# Patient Record
Sex: MALE | Race: WHITE | URBAN - METROPOLITAN AREA
[De-identification: names, ages, dates, MRNs, and addresses within clinical notes are randomized per-mention and may not be internally consistent; named-entity substitution may affect disease eponyms.]

---

## 2017-01-20 ENCOUNTER — OFFICE VISIT (OUTPATIENT)
Dept: FAMILY MEDICINE | Facility: CLINIC | Age: 52
End: 2017-01-20

## 2017-01-20 ENCOUNTER — TRANSFERRED RECORDS (OUTPATIENT)
Dept: FAMILY MEDICINE | Facility: CLINIC | Age: 52
End: 2017-01-20

## 2017-01-20 VITALS
HEART RATE: 68 BPM | WEIGHT: 216 LBS | SYSTOLIC BLOOD PRESSURE: 118 MMHG | DIASTOLIC BLOOD PRESSURE: 70 MMHG | TEMPERATURE: 97.8 F | HEIGHT: 73 IN | BODY MASS INDEX: 28.63 KG/M2 | OXYGEN SATURATION: 98 %

## 2017-01-20 DIAGNOSIS — I82.442 ACUTE DEEP VEIN THROMBOSIS (DVT) OF TIBIAL VEIN OF LEFT LOWER EXTREMITY (H): Primary | ICD-10-CM

## 2017-01-20 DIAGNOSIS — Z23 NEED FOR VACCINATION: ICD-10-CM

## 2017-01-20 DIAGNOSIS — D68.51 HETEROZYGOUS FACTOR V LEIDEN MUTATION (H): ICD-10-CM

## 2017-01-20 LAB — INR POINT OF CARE: 1.9 (ref 0.9–1.1)

## 2017-01-20 PROCEDURE — 36415 COLL VENOUS BLD VENIPUNCTURE: CPT | Performed by: PHYSICIAN ASSISTANT

## 2017-01-20 PROCEDURE — 99213 OFFICE O/P EST LOW 20 MIN: CPT | Mod: 25 | Performed by: PHYSICIAN ASSISTANT

## 2017-01-20 PROCEDURE — 90686 IIV4 VACC NO PRSV 0.5 ML IM: CPT | Performed by: PHYSICIAN ASSISTANT

## 2017-01-20 PROCEDURE — 90471 IMMUNIZATION ADMIN: CPT | Performed by: PHYSICIAN ASSISTANT

## 2017-01-20 PROCEDURE — 85610 PROTHROMBIN TIME: CPT | Performed by: PHYSICIAN ASSISTANT

## 2017-01-20 RX ORDER — WARFARIN SODIUM 5 MG/1
TABLET ORAL
Qty: 90 TABLET | Refills: 0 | Status: SHIPPED | OUTPATIENT
Start: 2017-01-20 | End: 2017-01-30

## 2017-01-20 NOTE — PROGRESS NOTES
SUBJECTIVE:                                                    Moose Enciso is a 51 year old male who presents to clinic today for the following health issues:        Chronic coumadin use    Indication: DVT    Recent oncology visit - warfarin recommended for 6 months - labs pending. FU with oncology in 5 months.   Has Factor V Mutation    Recent Labs   Lab Test  01/20/17   1011  12/29/16   1610  12/20/16   1029  12/16/16   1221  12/14/16   1113  12/12/16   0951   INR  1.9  2.0  2.0  2.1  1.9  1.7        Current Coumadin Dose:  7.5 all days, 10 MWF    Bleeding Signs/Symptoms:  None  Thromboembolic Signs/Symptoms:  None    Medication Changes:  No  Dietary Changes:  No  Bacterial/Viral Infection:  No    Missed Coumadin Doses:  None    Other Concerns:  Flu shot today            ROS:   C: NEGATIVE for fever, chills, change in weight  E: NEGATIVE for vision changes or irritation  E/M: NEGATIVE for ear, mouth and throat problems  R: NEGATIVE for significant cough or SOB  CV: NEGATIVE for chest pain, palpitations or peripheral edema  GI: NEGATIVE for nausea, abdominal pain, heartburn, or change in bowel habits  : NEGATIVE for frequency, dysuria, or hematuria    Problem list, Medication list, Allergies, and Medical/Social/Surgical histories reviewed in Psychiatric and updated as appropriate.      Labs reviewed in EPIC  BP Readings from Last 3 Encounters:   01/20/17 118/70   12/29/16 102/64   12/20/16 110/66    Wt Readings from Last 3 Encounters:   01/20/17 97.977 kg (216 lb)   12/29/16 95.255 kg (210 lb)   12/20/16 95.8 kg (211 lb 3.2 oz)                  Patient Active Problem List   Diagnosis     Health Care Home     ACP (advance care planning)     Malignant melanoma of skin of upper limb, including shoulder, left (H)     Calculus of kidney     Acute deep vein thrombosis (DVT) of tibial vein of left lower extremity (H)     Heterozygous factor V Leiden mutation (H)     Past Surgical History   Procedure Laterality Date     Pet, rec  "of melanoma/met ca       Appendectomy         Social History   Substance Use Topics     Smoking status: Former Smoker     Smokeless tobacco: Never Used      Comment: occasionally in his 20s     Alcohol Use: 3.0 oz/week     5 Standard drinks or equivalent per week     Family History   Problem Relation Age of Onset     Coronary Artery Disease No family hx of      CEREBROVASCULAR DISEASE No family hx of      DIABETES Maternal Grandmother      Hypertension No family hx of      Colon Cancer No family hx of      Prostate Cancer No family hx of      Blood Disease No family hx of          Current Outpatient Prescriptions   Medication Sig Dispense Refill     warfarin (COUMADIN) 5 MG tablet Take two tablets on Monday, Wed, Friday, Sunday (10 mg), other days take 1.5 tablets (7.5mg ) 90 tablet 0     [DISCONTINUED] warfarin (COUMADIN) 5 MG tablet Take two tablets on Monday, Wed,  and Friday (10 mg), other days take 1.5 tablets (7.5mg ) 60 tablet 0     Allergies   Allergen Reactions     Penicillins      Recent Labs   Lab Test  12/09/16   1108   ALT  11   CR  0.92   GFRESTIMATED  97   POTASSIUM  4.3            OBJECTIVE:                                                    /70 mmHg  Pulse 68  Temp(Src) 97.8  F (36.6  C) (Oral)  Ht 1.854 m (6' 1\")  Wt 97.977 kg (216 lb)  BMI 28.50 kg/m2  SpO2 98%   Body mass index is 28.5 kg/(m^2).       Patient is a 51 year old male, in no acute distress. Vitals noted   Head: Normocephalic, atraumatic.  Eyes: Conjunctiva clear.  No discharge noted.  Ears: External ears, canals and TMs normal Bilaterally: gray and translucent.   Nose: Normal without discharge.  Mouth / Throat:   Pharynx non-erythematous, no exudates present, tonsils without hypertrophy. Mucous membranes moist.  Neck:  Neck supple, No lymphadenopathy, no thyromegaly.  Cardiac:  Normal rhythm and rate, no murmurs, rubs or gallops.  Lungs: clear to auscultation bilaterally; no wheezes, crackles or rhonchi     "       ASSESSMENT/PLAN:                                                          ICD-10-CM    1. Acute deep vein thrombosis (DVT) of tibial vein of left lower extremity (H) I82.442 PROTHROMBIN TIME/INR     VENOUS COLLECTION     warfarin (COUMADIN) 5 MG tablet   2. Need for vaccination Z23 HC FLU VAC PRESRV FREE QUAD SPLIT VIR 3+YRS IM     VACCINE ADMINISTRATION, INITIAL   3. Heterozygous factor V Leiden mutation (H) D68.51          Therapeutic INR for goal of 2-3    New Dose: increase 10 mg 4 days a week    Next INR: 1 month for CPE      Carin Corona PA-C  1/19/2017

## 2017-01-20 NOTE — NURSING NOTE
Moose Enciso is here for INR    Pre-Visit Screening :  Immunizations : up to date  Colon Screening : is due and to be scheduled by patient for later completion  Asthma Action Test/Plan : NA  PHQ9/GAD7 :  NA    BP done on the left arm, with a large sized cuff.  Pulse - regular  My Chart - declines    CLASSIFICATION OF OVERWEIGHT AND OBESITY BY BMI                         Obesity Class           BMI(kg/m2)  Underweight                                    < 18.5  Normal                                         18.5-24.9  Overweight                                     25.0-29.9  OBESITY                     I                  30.0-34.9                              II                 35.0-39.9  EXTREME OBESITY             III                >40                             Patient's  BMI Body mass index is 28.5 kg/(m^2).  http://hin.nhlbi.nih.gov/menuplanner/menu.cgi  Questioned patient about current smoking habits.  Pt. has never smoked.    Sonya Oliver MA

## 2017-01-28 DIAGNOSIS — I82.442 ACUTE DEEP VEIN THROMBOSIS (DVT) OF TIBIAL VEIN OF LEFT LOWER EXTREMITY (H): Primary | ICD-10-CM

## 2017-01-28 RX ORDER — WARFARIN SODIUM 5 MG/1
TABLET ORAL
Qty: 60 TABLET | Refills: 0 | OUTPATIENT
Start: 2017-01-28

## 2017-01-30 NOTE — TELEPHONE ENCOUNTER
Carin,     Pt was prescribed 90 tablets to last 1 months, however Pt really needs 270 to tablets to last 1 month.  He has 90 tablets to  that will last him 10 days. Here is a prescription to fill for 180 pills that will last him for the entire month until he returns.      Do you want to refill this?  Pending Prescriptions:                       Disp   Refills    warfarin (COUMADIN) 5 MG tablet           180 ta*0            Sig: Take two tablets on Monday, Wed, Friday, Sunday           (10 mg), other days take 1.5 tablets (7.5mg )  Refused Prescriptions:                       Disp   Refills    warfarin (COUMADIN) 5 MG tablet [Pharmacy *60 tab*0        Sig: TAKE 2 TABLETS BY MOUTH ON MONDAY AND FRIDAY. ON           OTHER DAYS TAKE 1 1/2 TABLETS  Refused By: [address] [facility]  Reason for Refusal: Duplicate  Reason for Refusal Comment: #90 was sent to [facility] on 1/20/17          Pharmacy has been selected  Tmamy.[email or web address] Larsen ([facility])

## 2017-01-31 RX ORDER — WARFARIN SODIUM 5 MG/1
TABLET ORAL
Qty: 90 TABLET | Refills: 0 | Status: SHIPPED | OUTPATIENT
Start: 2017-01-31 | End: 2017-03-21

## 2017-01-31 NOTE — TELEPHONE ENCOUNTER
That math doesn't make sense. Max he is taking 2 tablets a day which is 60 pills for one month and I just sent in a prescription last week.     I called pt.   He states he never got rx from mail order pharmacy and has been out for a couple of days.     I sent in a new rx to [facility].      Please call the mail order pharmacy in his chart and cancel rx that was sent to them.      aCrin Corona PA-C  1/31/2017

## 2017-02-24 ENCOUNTER — OFFICE VISIT (OUTPATIENT)
Dept: FAMILY MEDICINE | Facility: CLINIC | Age: 52
End: 2017-02-24

## 2017-02-24 VITALS
WEIGHT: 211.1 LBS | TEMPERATURE: 97.7 F | DIASTOLIC BLOOD PRESSURE: 70 MMHG | HEIGHT: 76 IN | BODY MASS INDEX: 25.71 KG/M2 | OXYGEN SATURATION: 98 % | SYSTOLIC BLOOD PRESSURE: 108 MMHG | HEART RATE: 82 BPM

## 2017-02-24 DIAGNOSIS — D68.51 FACTOR V LEIDEN MUTATION (H): ICD-10-CM

## 2017-02-24 DIAGNOSIS — Z00.00 ROUTINE GENERAL MEDICAL EXAMINATION AT A HEALTH CARE FACILITY: Primary | ICD-10-CM

## 2017-02-24 DIAGNOSIS — I82.442 ACUTE DEEP VEIN THROMBOSIS (DVT) OF TIBIAL VEIN OF LEFT LOWER EXTREMITY (H): ICD-10-CM

## 2017-02-24 DIAGNOSIS — Z85.820 HISTORY OF MELANOMA: ICD-10-CM

## 2017-02-24 DIAGNOSIS — Z12.11 SPECIAL SCREENING FOR MALIGNANT NEOPLASMS, COLON: ICD-10-CM

## 2017-02-24 LAB
ERYTHROCYTE [DISTWIDTH] IN BLOOD BY AUTOMATED COUNT: 11.5 %
GLUCOSE SERPL-MCNC: 84 MG/DL (ref 60–99)
HCT VFR BLD AUTO: 50.4 % (ref 40–53)
HEMOGLOBIN: 16 G/DL (ref 13.3–17.7)
INR POINT OF CARE: 2.2 (ref 0.9–1.1)
MCH RBC QN AUTO: 29.3 PG (ref 26–33)
MCHC RBC AUTO-ENTMCNC: 31.7 G/DL (ref 31–36)
MCV RBC AUTO: 92.3 FL (ref 78–100)
PLATELET COUNT - QUEST: 245 10^9/L (ref 150–375)
RBC # BLD AUTO: 5.46 10*12/L (ref 4.4–5.9)
WBC # BLD AUTO: 6 10*9/L (ref 4–11)

## 2017-02-24 PROCEDURE — 82947 ASSAY GLUCOSE BLOOD QUANT: CPT | Performed by: PHYSICIAN ASSISTANT

## 2017-02-24 PROCEDURE — 85610 PROTHROMBIN TIME: CPT | Performed by: PHYSICIAN ASSISTANT

## 2017-02-24 PROCEDURE — 36415 COLL VENOUS BLD VENIPUNCTURE: CPT | Performed by: PHYSICIAN ASSISTANT

## 2017-02-24 PROCEDURE — 80061 LIPID PANEL: CPT | Mod: 90 | Performed by: PHYSICIAN ASSISTANT

## 2017-02-24 PROCEDURE — 99396 PREV VISIT EST AGE 40-64: CPT | Performed by: PHYSICIAN ASSISTANT

## 2017-02-24 PROCEDURE — 85027 COMPLETE CBC AUTOMATED: CPT | Performed by: PHYSICIAN ASSISTANT

## 2017-02-24 NOTE — LETTER
[facility]  [place] [facility] [address]  [place]  [address]    February 28, 2017        Moose Enciso  [address]  [place] [address]              Dear Moose Enciso    Fasting glucose was normal - no evidence of diabetes.    No anemia  Normal white blood cell count  Normal Platelets count    Your cholesterol levels were elevated  Goal for total cholesterol is less than 200, HDL (good cholesterol) is Greater than 45, LDL (bad cholesterol) is less than 130 and less than 100 if high risk, and triglycerides should be less than 150    Since your numbers are higher than normal, we would first recommend a trial of low cholesterol diet and regular (4-5 times per week) exercise.     Food options that are considered Heart healthy ( lower LDL and raise HDL) are: olives, peanuts, cashews, almonds, avocados and fish.    Flax seed is a good source of fiber and lowers most pople's cholesterol. You can sprinkle this on cereal daily.     We should re-check your cholesterol in 1 year.       Labs Resulted Today:   Results for orders placed or performed in visit on 02/24/17   PROTHROMBIN TIME/INR   Result Value Ref Range    INR Protime 2.2    Lipid Profile (QUEST)   Result Value Ref Range    Cholesterol 214 (H) 125 - 200 mg/dL    HDL Cholesterol 42 > OR = 40 mg/dL    Triglycerides 149 <150 mg/dL    LDL Cholesterol Calculated 142 (H) <130 mg/dL (calc)    Cholesterol/HDL Ratio 5.1 (H) < OR = 5.0 (calc)    Non HDL Cholesterol 172 (H) mg/dL (calc)   Glucose Fasting (BFP)   Result Value Ref Range    Glucose 84 60 - 99 mg/dL   HEMOGRAM/PLATELET   Result Value Ref Range    WBC 6.0 4.0 - 11 10*9/L    RBC Count 5.46 4.4 - 5.9 10*12/L    Hemoglobin 16.0 13.3 - 17.7 g/dL    Hematocrit 50.4 40.0 - 53.0 %    MCV 92.3 78 - 100 fL    MCH 29.3 26 - 33 pg    MCHC 31.7 31 - 36 g/dL    RDW 11.5 %    Platelet Count 245 150 - 375 10^9/L            If you have any further questions or  problems, please contact our office at [phone].          Carin Corona PA-C

## 2017-02-24 NOTE — NURSING NOTE
Moose is here for CPX    Patient is here for a full physical exam and pap.  Pre-Visit Screening :  Immunizations : up to date  Colonoscopy : is due and to be scheduled by patient for later completion  Asthma Action Plan/Test : SIDNEY  PHQ9/GAD7 : SIDNEY  Pulse - regular  My Chart - declines    CLASSIFICATION OF OVERWEIGHT AND OBESITY BY BMI                         Obesity Class           BMI(kg/m2)  Underweight                                    < 18.5  Normal                                         18.5-24.9  Overweight                                     25.0-29.9  OBESITY                     I                  30.0-34.9                              II                 35.0-39.9  EXTREME OBESITY             III                >40                             Patient's  BMI Body mass index is 25.7 kg/(m^2).  http://hin.nhlbi.nih.gov/menuplanner/menu.cgi  Questioned patient about current smoking habits.  Pt. has never smoked.    [unfilled]  ETOH screening:  Questions:  1-How often do you have a drink containing alcohol?                             5 times per week(s)  2-How many drinks containing alcohol do you have on a typical day when you are         Drinking?                              2   3- How often do you have 5 or more drinks on one occasion?                              NeverHave you ever:  @None of the patient's responses to the CAGE screening were positive / Negative CAGE score@

## 2017-02-24 NOTE — LETTER
[facility]  [place] [facility] [address]  [place]  [address]    February 24, 2017        Moose Enciso  [address]  [place] [address]              Dear Moose Frank would like you to have repeat ultrasound in May and follow up appointment with him to discuss your long term risks with the Factor V Leiden mutation.  Please schedule that ultrasound and appt. If you haven't.    I did put in the order for the colonoscopy and Dr. Frank will be consulted by the Gastroenterologist to determine whether we stop your coumadin and use an alternative medication for the colonoscopy. You should get a call from [facility] to schedule this.  If you would prefer to wait until after May, please let [facility] know this.      If you have any further questions or problems, please contact our office at [phone].          Carin Corona PA-C

## 2017-02-24 NOTE — PROGRESS NOTES
SUBJECTIVE:     CC: Moose Enciso is an 51 year old male who presents for preventative health visit.     Healthy Habits:    Do you get at least three servings of calcium containing foods daily (dairy, green leafy vegetables, etc.)? yes    Amount of exercise or daily activities, outside of work: none    Problems taking medications regularly No    Medication side effects: No    Have you had an eye exam in the past two years? yes    Do you see a dentist twice per year? Yes    Do you have sleep apnea, excessive snoring or daytime drowsiness?no      INR Check:  Indication: DVT  Current dose: 7.5 all days, 10 MWFSun    Bleeding Signs/Symptoms:  None  Thromboembolic Signs/Symptoms:  None    Medication Changes:  No  Dietary Changes:  No  Bacterial/Viral Infection:  No    Missed Coumadin Doses:  None          Today's PHQ-2 Score: No flowsheet data found.      Social History   Substance Use Topics     Smoking status: Former Smoker     Smokeless tobacco: Never Used      Comment: occasionally in his 20s     Alcohol use 3.0 oz/week     5 Standard drinks or equivalent per week     The patient does not drink >3 drinks per day nor >7 drinks per week.    Last PSA: No results found for: PSA    No results for input(s): CHOL, HDL, LDL, TRIG, CHOLHDLRATIO, NHDL in the last 98575 hours.    Reviewed orders with patient. Reviewed health maintenance and updated orders accordingly - Yes    All Histories reviewed and updated in Epic.  Past Medical History   Diagnosis Date     DVT (deep venous thrombosis) (H)      Kidney stone      Melanoma (H)       Past Surgical History   Procedure Laterality Date     Pet, rec of melanoma/met ca  1990     Appendectomy  1978       ROS:  C: NEGATIVE for fever, chills, change in weight  I: NEGATIVE for worrisome rashes, moles or lesions  E: NEGATIVE for vision changes or irritation  ENT: NEGATIVE for ear, mouth and throat problems  R: NEGATIVE for significant cough or SOB  CV: NEGATIVE for chest pain, palpitations  or peripheral edema  GI: NEGATIVE for nausea, abdominal pain, heartburn, or change in bowel habits   male: negative for dysuria, hematuria, decreased urinary stream, erectile dysfunction, urethral discharge  M: NEGATIVE for significant arthralgias or myalgia  N: NEGATIVE for weakness, dizziness or paresthesias  P: NEGATIVE for changes in mood or affect    Problem list, Medication list, Allergies, and Medical/Social/Surgical histories reviewed in [facility] and updated as appropriate.  Labs reviewed in EPIC  BP Readings from Last 3 Encounters:   02/24/17 108/70   01/20/17 118/70   12/29/16 102/64    Wt Readings from Last 3 Encounters:   02/24/17 95.8 kg (211 lb 1.6 oz)   01/20/17 98 kg (216 lb)   12/29/16 95.3 kg (210 lb)                  Patient Active Problem List   Diagnosis     Health Care Home     ACP (advance care planning)     Acute deep vein thrombosis (DVT) of tibial vein of left lower extremity (H)     Factor V Leiden mutation (H)     History of melanoma     Past Surgical History   Procedure Laterality Date     Pet, rec of melanoma/met ca  1990     Appendectomy  1978       Social History   Substance Use Topics     Smoking status: Former Smoker     Smokeless tobacco: Never Used      Comment: occasionally in his 20s     Alcohol use 3.0 oz/week     5 Standard drinks or equivalent per week     Family History   Problem Relation Age of Onset     DIABETES Maternal Grandmother      Coronary Artery Disease No family hx of      CEREBROVASCULAR DISEASE No family hx of      Hypertension No family hx of      Colon Cancer No family hx of      Prostate Cancer No family hx of      Blood Disease No family hx of          Current Outpatient Prescriptions   Medication Sig Dispense Refill     warfarin (COUMADIN) 5 MG tablet Take two tablets on Monday, Wed, Friday, Sunday (10 mg), other days take 1.5 tablets (7.5mg ) 90 tablet 0     Allergies   Allergen Reactions     Penicillins      Recent Labs   Lab Test  12/09/16   1108   ALT  11  "  CR  0.92   GFRESTIMATED  97   POTASSIUM  4.3      OBJECTIVE:     /70 (BP Location: Right arm, Patient Position: Chair, Cuff Size: Adult Large)  Pulse 82  Temp 97.7  F (36.5  C) (Oral)  Ht 1.93 m (6' 4\")  Wt 95.8 kg (211 lb 1.6 oz)  SpO2 98%  BMI 25.7 kg/m2  EXAM:  GENERAL: healthy, alert and no distress  EYES: Eyes grossly normal to inspection, PERRL and conjunctivae and sclerae normal  HENT: ear canals and TM's normal, nose and mouth without ulcers or lesions  NECK: no adenopathy, no asymmetry, masses, or scars and thyroid normal to palpation  RESP: lungs clear to auscultation - no rales, rhonchi or wheezes  CV: regular rate and rhythm, normal S1 S2, no S3 or S4, no murmur, click or rub, no peripheral edema and peripheral pulses strong  ABDOMEN: soft, nontender, no hepatosplenomegaly, no masses and bowel sounds normal   (male): normal male genitalia without lesions or urethral discharge, no hernia  RECTAL: normal sphincter tone, no rectal masses, prostate normal size, smooth, nontender without nodules or masses  MS: no gross musculoskeletal defects noted, no edema  SKIN: no suspicious lesions or rashes  NEURO: Normal strength and tone, mentation intact and speech normal  PSYCH: mentation appears normal, affect normal/bright    ASSESSMENT/PLAN:     1. Acute deep vein thrombosis (DVT) of tibial vein of left lower extremity (H)  - PROTHROMBIN TIME/INR  FU 6 weeks. OK for refills when calls    2. Routine general medical examination at a health care facility  - VENOUS COLLECTION  - Lipid Profile (QUEST)  - Glucose Fasting (BFP)  - HEMOGRAM/PLATELET    3. Factor V Leiden mutation (H)  I called [facility]  May recommended repeat US  Followed by consult with Dr. Frank    4. History of melanoma  Pt not seeing dermatology. Has no concerns    5. Screening for colon cancer  Referral placed      COUNSELING:   Special attention given to:        Regular exercise       Healthy diet/nutrition       Vision screening       " "Colon cancer screening    Blood Pressure:   BP Readings from Last 6 Encounters:   02/24/17 108/70   01/20/17 118/70   12/29/16 102/64   12/20/16 110/66   12/16/16 120/80   12/14/16 100/60     Don't leave message on cell  ok to leave message on home phone only          reports that he has quit smoking. He has never used smokeless tobacco.    Estimated body mass index is 25.7 kg/(m^2) as calculated from the following:    Height as of this encounter: 1.93 m (6' 4\").    Weight as of this encounter: 95.8 kg (211 lb 1.6 oz).   Weight management plan: Discussed healthy diet and exercise guidelines and patient will follow up in 12 months in clinic to re-evaluate.    Counseling Resources:  ATP IV Guidelines  Pooled Cohorts Equation Calculator  FRAX Risk Assessment  ICSI Preventive Guidelines  Dietary Guidelines for Americans, 2010  USDA's MyPlate  ASA Prophylaxis  Lung CA Screening    ARIANA Briceno  [facility]    "

## 2017-02-24 NOTE — MR AVS SNAPSHOT
After Visit Summary   2/24/2017    Moose Enciso    MRN: [id number]           Patient Information     Date Of Birth          1965        Visit Information        Provider Department      2/24/2017 9:30 AM Carin Corona PA [facility]        Today's Diagnoses     Routine general medical examination at a health care facility    -  1    Acute deep vein thrombosis (DVT) of tibial vein of left lower extremity (H)        Factor V Leiden mutation (H)        History of melanoma        Special screening for malignant neoplasms, colon           Follow-ups after your visit        Additional Services     GASTROENTEROLOGY ADULT REF PROCEDURE ONLY       [facility] [place] Please    Last Lab Result: Creatinine (mg/dL)       Date                     Value                 12/09/2016               0.92             ----------  Body mass index is 25.7 kg/(m^2).     Needed:  No  Language:  English    Patient will be contacted to schedule procedure.     Please be aware that coverage of these services is subject to the terms and limitations of your health insurance plan.  Call member services at your health plan with any benefit or coverage questions.  Any procedures must be performed at a [place] facility OR coordinated by your clinic's referral office.    Please bring the following with you to your appointment:    (1) Any X-Rays, CTs or MRIs which have been performed.  Contact the facility where they were done to arrange for  prior to your scheduled appointment.    (2) List of current medications   (3) This referral request   (4) Any documents/labs given to you for this referral                  Your next 10 appointments already scheduled     Apr 07, 2017  9:30 AM CDT   Office Visit with ARIANA Briceno   [facility] ([facility])    [address]  [address]  [facility] [id number] [phone]          "     Who to contact     If you have questions or need follow up information about today's clinic visit or your schedule please contact [facility] directly at [phone].  Normal or non-critical lab and imaging results will be communicated to you by MyChart, letter or phone within 4 business days after the clinic has received the results. If you do not hear from us within 7 days, please contact the clinic through MyChart or phone. If you have a critical or abnormal lab result, we will notify you by phone as soon as possible.  Submit refill requests through dotSyntax or call your pharmacy and they will forward the refill request to us. Please allow 3 business days for your refill to be completed.          Additional Information About Your Visit        Care EveryWhere ID     This is your Care EveryWhere ID. This could be used by other organizations to access your [place] medical records  [id number]        Your Vitals Were     Pulse Temperature Height Pulse Oximetry BMI (Body Mass Index)       82 97.7  F (36.5  C) (Oral) 1.93 m (6' 4\") 98% 25.7 kg/m2        Blood Pressure from Last 3 Encounters:   02/24/17 108/70   01/20/17 118/70   12/29/16 102/64    Weight from Last 3 Encounters:   02/24/17 95.8 kg (211 lb 1.6 oz)   01/20/17 98 kg (216 lb)   12/29/16 95.3 kg (210 lb)              We Performed the Following     GASTROENTEROLOGY ADULT REF PROCEDURE ONLY     Glucose Fasting (BFP)     HEMOGRAM/PLATELET     Lipid Profile (QUEST)     PROTHROMBIN TIME/INR     VENOUS COLLECTION        Primary Care Provider Office Phone # Fax #    Narciso Gonzalez [phone]       [facility] [address] [facility] [address]  [place] [facility] [place] [facility] [address]        Thank you!     Thank you for choosing [facility]  for your care. Our goal is always to provide you with excellent care. Hearing back from our patients is one way we can continue to improve our services. " Please take a few minutes to complete the written survey that you may receive in the mail after your visit with us. Thank you!             Your Updated Medication List - Protect others around you: Learn how to safely use, store and throw away your medicines at www.disposemymeds.org.          This list is accurate as of: 2/24/17 10:03 AM.  Always use your most recent med list.                   Brand Name Dispense Instructions for use    warfarin 5 MG tablet    COUMADIN    90 tablet    Take two tablets on Monday, Wed, Friday, Sunday (10 mg), other days take 1.5 tablets (7.5mg )

## 2017-02-25 LAB
CHOLEST SERPL-MCNC: 214 MG/DL (ref 125–200)
CHOLEST/HDLC SERPL: 5.1 (CALC)
HDLC SERPL-MCNC: 42 MG/DL
LDLC SERPL CALC-MCNC: 142 MG/DL (CALC)
NONHDLC SERPL-MCNC: 172 MG/DL (CALC)
TRIGL SERPL-MCNC: 149 MG/DL

## 2017-03-21 DIAGNOSIS — I82.442 ACUTE DEEP VEIN THROMBOSIS (DVT) OF TIBIAL VEIN OF LEFT LOWER EXTREMITY (H): ICD-10-CM

## 2017-03-21 RX ORDER — WARFARIN SODIUM 5 MG/1
TABLET ORAL
Qty: 90 TABLET | Refills: 0 | Status: SHIPPED | OUTPATIENT
Start: 2017-03-21 | End: 2017-05-07

## 2017-03-21 RX ORDER — WARFARIN SODIUM 5 MG/1
TABLET ORAL
Qty: 90 TABLET | Refills: 0 | OUTPATIENT
Start: 2017-03-21

## 2017-03-21 NOTE — TELEPHONE ENCOUNTER
Moose Enciso is requesting a refill of:    Pending Prescriptions:                       Disp   Refills    warfarin (COUMADIN) 5 MG tablet           90 tab*0            Sig: Take two tablets on Monday, Wed, Friday, Sunday           (10 mg), other days take 1.5 tablets (7.5mg )    Please close encounter if RX was sent. Thanks, Arti

## 2017-03-21 NOTE — TELEPHONE ENCOUNTER
Another Refill for request for Warfarin has come in.   Was refilled today by Renata Munoz.DANYELLE Larsen ([facility])

## 2017-04-06 NOTE — PROGRESS NOTES
SUBJECTIVE:                                                    Moose Enciso is a 51 year old male who presents to clinic today for the following health issues:        Chronic coumadin use    Indication: DVT, Factor V Leiden        Recent Labs   Lab Test  04/07/17   0936  02/24/17   0941  01/20/17   1011  12/29/16   1610  12/20/16   1029  12/16/16   1221   INR  2.7  2.2  1.9  2.0  2.0  2.1        Current Coumadin Dose:  7.5 all days, 10 MWFSun    Bleeding Signs/Symptoms:  None  Thromboembolic Signs/Symptoms:  None    Medication Changes:  No  Dietary Changes:  No  Bacterial/Viral Infection:  No    Missed Coumadin Doses:  None    Other Concerns:  None            ROS:   C: NEGATIVE for fever, chills, change in weight  E: NEGATIVE for vision changes or irritation  E/M: NEGATIVE for ear, mouth and throat problems  R: NEGATIVE for significant cough or SOB  CV: NEGATIVE for chest pain, palpitations or peripheral edema  GI: NEGATIVE for nausea, abdominal pain, heartburn, or change in bowel habits  : NEGATIVE for frequency, dysuria, or hematuria    Problem list, Medication list, Allergies, and Medical/Social/Surgical histories reviewed in [facility] and updated as appropriate.      Labs reviewed in EPIC  BP Readings from Last 3 Encounters:   04/07/17 112/64   02/24/17 108/70   01/20/17 118/70    Wt Readings from Last 3 Encounters:   04/07/17 97.5 kg (215 lb)   02/24/17 95.8 kg (211 lb 1.6 oz)   01/20/17 98 kg (216 lb)                  Patient Active Problem List   Diagnosis     Health Care Home     ACP (advance care planning)     Acute deep vein thrombosis (DVT) of tibial vein of left lower extremity (H)     Factor V Leiden mutation (H)     History of melanoma     Past Surgical History:   Procedure Laterality Date     APPENDECTOMY  1978     PET, REC OF MELANOMA/MET CA  1990       Social History   Substance Use Topics     Smoking status: Former Smoker     Smokeless tobacco: Never Used      Comment: occasionally in his 20s     Alcohol  use 3.0 oz/week     5 Standard drinks or equivalent per week     Family History   Problem Relation Age of Onset     DIABETES Maternal Grandmother      Coronary Artery Disease No family hx of      CEREBROVASCULAR DISEASE No family hx of      Hypertension No family hx of      Colon Cancer No family hx of      Prostate Cancer No family hx of      Blood Disease No family hx of          Current Outpatient Prescriptions   Medication Sig Dispense Refill     warfarin (COUMADIN) 5 MG tablet Take two tablets on Monday, Wed, Friday, Sunday (10 mg), other days take 1.5 tablets (7.5mg ) 90 tablet 0     Allergies   Allergen Reactions     Penicillins      Recent Labs   Lab Test  02/24/17   1004  12/09/16   1108   LDL  142*   --    HDL  42   --    TRIG  149   --    ALT   --   11   CR   --   0.92   GFRESTIMATED   --   97   POTASSIUM   --   4.3            OBJECTIVE:                                                    /64 (BP Location: Right arm, Patient Position: Chair, Cuff Size: Adult Large)  Pulse 92  Temp 98.2  F (36.8  C) (Oral)  Wt 97.5 kg (215 lb)  SpO2 97%  BMI 26.17 kg/m2   Body mass index is 26.17 kg/(m^2).       Patient is a 51 year old male, in no acute distress. Vitals noted   Head: Normocephalic, atraumatic.  Eyes: Conjunctiva clear.  No discharge noted.  Ears: External ears, canals and TMs normal Bilaterally: gray and translucent.   Nose: Normal without discharge.  Mouth / Throat:   Pharynx non-erythematous, no exudates present, tonsils without hypertrophy. Mucous membranes moist.  Neck:  Neck supple, No lymphadenopathy, no thyromegaly.  Cardiac:  Normal rhythm and rate, no murmurs, rubs or gallops.  Lungs: clear to auscultation bilaterally; no wheezes, crackles or rhonchi           ASSESSMENT/PLAN:                                                          ICD-10-CM    1. Acute deep vein thrombosis (DVT) of tibial vein of left lower extremity (H) I82.442 PROTHROMBIN TIME/INR     VENOUS COLLECTION          Therapeutic INR for goal of 2-3        Next INR: 6 weeks      Carin Corona PA-C  4/6/2017

## 2017-04-07 ENCOUNTER — OFFICE VISIT (OUTPATIENT)
Dept: FAMILY MEDICINE | Facility: CLINIC | Age: 52
End: 2017-04-07

## 2017-04-07 VITALS
TEMPERATURE: 98.2 F | DIASTOLIC BLOOD PRESSURE: 64 MMHG | WEIGHT: 215 LBS | HEART RATE: 92 BPM | OXYGEN SATURATION: 97 % | BODY MASS INDEX: 26.17 KG/M2 | SYSTOLIC BLOOD PRESSURE: 112 MMHG

## 2017-04-07 DIAGNOSIS — I82.442 ACUTE DEEP VEIN THROMBOSIS (DVT) OF TIBIAL VEIN OF LEFT LOWER EXTREMITY (H): Primary | ICD-10-CM

## 2017-04-07 LAB — INR POINT OF CARE: 2.7 (ref 2–3)

## 2017-04-07 PROCEDURE — 85610 PROTHROMBIN TIME: CPT | Performed by: PHYSICIAN ASSISTANT

## 2017-04-07 PROCEDURE — 36415 COLL VENOUS BLD VENIPUNCTURE: CPT | Performed by: PHYSICIAN ASSISTANT

## 2017-04-07 PROCEDURE — 99213 OFFICE O/P EST LOW 20 MIN: CPT | Performed by: PHYSICIAN ASSISTANT

## 2017-04-07 NOTE — MR AVS SNAPSHOT
"              After Visit Summary   2017    Moose Enciso    MRN: [id number]           Patient Information     Date Of Birth          1965        Visit Information        Provider Department      2017 9:30 AM Carin Corona PA [facility]        Today's Diagnoses     Acute deep vein thrombosis (DVT) of tibial vein of left lower extremity (H)    -  1       Follow-ups after your visit        Who to contact     If you have questions or need follow up information about today's clinic visit or your schedule please contact [facility] directly at [phone].  Normal or non-critical lab and imaging results will be communicated to you by MyChart, letter or phone within 4 business days after the clinic has received the results. If you do not hear from us within 7 days, please contact the clinic through Ventrus Bioscienceshart or phone. If you have a critical or abnormal lab result, we will notify you by phone as soon as possible.  Submit refill requests through [employer] or call your pharmacy and they will forward the refill request to us. Please allow 3 business days for your refill to be completed.          Additional Information About Your Visit        MyChart Information     Medical Solutions lets you send messages to your doctor, view your test results, renew your prescriptions, schedule appointments and more. To sign up, go to [email or web address] . Click on \"Log in\" on the left side of the screen, which will take you to the Welcome page. Then click on \"Sign up Now\" on the right side of the page.     You will be asked to enter the access code listed below, as well as some personal information. Please follow the directions to create your username and password.     Your access code is: [email or web address]  Expires: 2017  9:40 AM     Your access code will  in 90 days. If you need help or a new code, please call your [place] clinic or [phone].        Care EveryWhere ID  "    This is your Care EveryWhere ID. This could be used by other organizations to access your [place] medical records  [id number]        Your Vitals Were     Pulse Temperature Pulse Oximetry BMI (Body Mass Index)          92 98.2  F (36.8  C) (Oral) 97% 26.17 kg/m2         Blood Pressure from Last 3 Encounters:   04/07/17 112/64   02/24/17 108/70   01/20/17 118/70    Weight from Last 3 Encounters:   04/07/17 97.5 kg (215 lb)   02/24/17 95.8 kg (211 lb 1.6 oz)   01/20/17 98 kg (216 lb)              We Performed the Following     PROTHROMBIN TIME/INR     VENOUS COLLECTION        Primary Care Provider Office Phone # Fax #    Narciso Gonzalez [phone]       [facility] [address] [facility] [address]  [facility] [address]        Thank you!     Thank you for choosing [facility]  for your care. Our goal is always to provide you with excellent care. Hearing back from our patients is one way we can continue to improve our services. Please take a few minutes to complete the written survey that you may receive in the mail after your visit with us. Thank you!             Your Updated Medication List - Protect others around you: Learn how to safely use, store and throw away your medicines at www.disposemymeds.org.          This list is accurate as of: 4/7/17  9:40 AM.  Always use your most recent med list.                   Brand Name Dispense Instructions for use    warfarin 5 MG tablet    COUMADIN    90 tablet    Take two tablets on Monday, Wed, Friday, Sunday (10 mg), other days take 1.5 tablets (7.5mg )

## 2017-04-07 NOTE — NURSING NOTE
Moose is here for his INR    Pre-Visit Screening :  Immunizations : up to date  Colon Screening : is up to date  Asthma Action Test/Plan : NA  PHQ9/GAD7 :  NA  Pulse - regular  My Chart - declines    CLASSIFICATION OF OVERWEIGHT AND OBESITY BY BMI                         Obesity Class           BMI(kg/m2)  Underweight                                    < 18.5  Normal                                         18.5-24.9  Overweight                                     25.0-29.9  OBESITY                     I                  30.0-34.9                              II                 35.0-39.9  EXTREME OBESITY             III                >40                             Patient's  BMI Body mass index is 26.17 kg/(m^2).  http://hin.nhlbi.nih.gov/menuplanner/menu.cgi  Questioned patient about current smoking habits.  Pt. has never smoked.

## 2017-05-07 DIAGNOSIS — I82.442 ACUTE DEEP VEIN THROMBOSIS (DVT) OF TIBIAL VEIN OF LEFT LOWER EXTREMITY (H): ICD-10-CM

## 2017-05-08 RX ORDER — WARFARIN SODIUM 5 MG/1
TABLET ORAL
Qty: 90 TABLET | Refills: 0 | Status: SHIPPED | OUTPATIENT
Start: 2017-05-08 | End: 2017-06-16

## 2017-05-19 ENCOUNTER — OFFICE VISIT (OUTPATIENT)
Dept: FAMILY MEDICINE | Facility: CLINIC | Age: 52
End: 2017-05-19

## 2017-05-19 VITALS
HEART RATE: 68 BPM | RESPIRATION RATE: 20 BRPM | TEMPERATURE: 97.9 F | BODY MASS INDEX: 26.91 KG/M2 | WEIGHT: 221.1 LBS | DIASTOLIC BLOOD PRESSURE: 74 MMHG | SYSTOLIC BLOOD PRESSURE: 120 MMHG

## 2017-05-19 DIAGNOSIS — Z86.718 HISTORY OF DEEP VENOUS THROMBOSIS: Primary | ICD-10-CM

## 2017-05-19 LAB — INR POINT OF CARE: 2 (ref 2–3)

## 2017-05-19 PROCEDURE — 85610 PROTHROMBIN TIME: CPT | Performed by: PHYSICIAN ASSISTANT

## 2017-05-19 PROCEDURE — 99213 OFFICE O/P EST LOW 20 MIN: CPT | Performed by: PHYSICIAN ASSISTANT

## 2017-05-19 PROCEDURE — 36415 COLL VENOUS BLD VENIPUNCTURE: CPT | Performed by: PHYSICIAN ASSISTANT

## 2017-05-19 NOTE — PROGRESS NOTES
Indication: DVT    INR Today:  2.0  Current Dose:  7.5 mg 3 days per week, 10 mg 4 days per week     Bleeding Signs/Symptoms:  None  Including headache, stroke symptoms (confusion, weakness, slurred speech), vision changes, nosebleeds, red/black vomit, red/black stool, prolonged bleeded, dizziness, SOB, pica.  Thromboembolic Signs/Symptoms:  Minor - Pain in left leg still from where clot was- stable since clot detected.   Including pain in one leg, swelling in extremity, chest pain, numbess, weakness, mental status change.      Medication Changes:  No  Dietary Changes:  No  Bacterial/Viral Infection:  No    Missed Coumadin Doses:  None    Other Concerns:  None    OBJECTIVE:  /74 (BP Location: Left arm, Patient Position: Chair, Cuff Size: Adult Regular)  Pulse 68  Temp 97.9  F (36.6  C) (Oral)  Resp 20  Wt 100.3 kg (221 lb 1.6 oz)  BMI 26.91 kg/m2  Body mass index is 26.91 kg/(m^2).    Patient is a 52 year old male, in no acute distress. Vitals noted.  Head: Normocephalic, atraumatic.  Eyes: Conjunctiva clear.  No discharge noted.  Neck:  Neck supple, No lymphadenopathy, no thyromegaly.  Cardiac:  Normal rhythm and rate, no murmurs, rubs or gallops.  Lungs: clear to auscultation bilaterally; no wheezes, crackles or rhonchi      ASSESSMENT and PLAN:    Therapeutic INR for goal of 2-3    New Dose: Same dose Coumadin   10 mg on 4 days per week       7.5 mg on 3 days per week    Will order US of lower extremity to ensure resolution of clot given continued pain. Recommended compression stockings, increased activity.    Next INR: 4 weeks    Libia Jimenez PA-C  5/19/2017

## 2017-05-19 NOTE — MR AVS SNAPSHOT
"              After Visit Summary   5/19/2017    Moose Enciso    MRN: [id number]           Patient Information     Date Of Birth          1965        Visit Information        Provider Department      5/19/2017 9:00 AM Libia Jimenez PA-C [facility]        Today's Diagnoses     History of deep venous thrombosis    -  1       Follow-ups after your visit        Follow-up notes from your care team     Return in about 4 weeks (around 6/16/2017).      Your next 10 appointments already scheduled     Jun 16, 2017  9:30 AM CDT   Office Visit with Libia Jimenez PA-C   [facility] ([facility])    [address]  [address]  [facility] [address] [phone]              Who to contact     If you have questions or need follow up information about today's clinic visit or your schedule please contact [facility] directly at [phone].  Normal or non-critical lab and imaging results will be communicated to you by DigiPathhart, letter or phone within 4 business days after the clinic has received the results. If you do not hear from us within 7 days, please contact the clinic through HowAboutWet or phone. If you have a critical or abnormal lab result, we will notify you by phone as soon as possible.  Submit refill requests through Los[employer] or call your pharmacy and they will forward the refill request to us. Please allow 3 business days for your refill to be completed.          Additional Information About Your Visit        Losonoco Information     Los[employer] lets you send messages to your doctor, view your test results, renew your prescriptions, schedule appointments and more. To sign up, go to [email or web address] . Click on \"Log in\" on the left side of the screen, which will take you to the Welcome page. Then click on \"Sign up Now\" on the right side of the page.     You will be asked to enter the access code listed " below, as well as some personal information. Please follow the directions to create your username and password.     Your access code is: [email or web address]  Expires: 2017  9:40 AM     Your access code will  in 90 days. If you need help or a new code, please call your [place] clinic or [phone].        Care EveryWhere ID     This is your Care EveryWhere ID. This could be used by other organizations to access your [place] medical records  [id number]        Your Vitals Were     Pulse Temperature Respirations BMI (Body Mass Index)          68 97.9  F (36.6  C) (Oral) 20 26.91 kg/m2         Blood Pressure from Last 3 Encounters:   17 120/74   17 112/64   17 108/70    Weight from Last 3 Encounters:   17 100.3 kg (221 lb 1.6 oz)   17 97.5 kg (215 lb)   17 95.8 kg (211 lb 1.6 oz)              We Performed the Following     CL BFP PROTHROMBIN TIME/INR (BFP)     US Lower Extremity Venous Duplex Left     VENOUS COLLECTION        Primary Care Provider Office Phone # Fax #    Narciso Gonzalez [phone]       [facility] [address]  [facility] [address]        Thank you!     Thank you for choosing [facility]  for your care. Our goal is always to provide you with excellent care. Hearing back from our patients is one way we can continue to improve our services. Please take a few minutes to complete the written survey that you may receive in the mail after your visit with us. Thank you!             Your Updated Medication List - Protect others around you: Learn how to safely use, store and throw away your medicines at www.disposemymeds.org.          This list is accurate as of: 17  9:57 AM.  Always use your most recent med list.                   Brand Name Dispense Instructions for use    warfarin 5 MG tablet    COUMADIN    90 tablet    TAKE 2 TABLETS BY MOUTH MONDAY, WEDNESDAY, FRIDAY, ,  OTHER DAYS TAKE 1 1/2 TABLETS

## 2017-05-19 NOTE — NURSING NOTE
Moose Enciso is here for a INR.    Pre-visit planning  Immunizations - up to date  Colonoscopy - is up to date  Mammogram -   Asthma -   PHQ9 -    SANDRO-7 -    Questioned patient about current smoking habits.  Pt. quit smoking some time ago.  Body mass index is 26.91 kg/(m^2).  PULSE regular  My Chart:   CLASSIFICATION OF OVERWEIGHT AND OBESITY BY BMI                        Obesity Class           BMI(kg/m2)  Underweight                                    < 18.5  Normal                                         18.5-24.9  Overweight                                     25.0-29.9  OBESITY                     I                  30.0-34.9                             II                 35.0-39.9  EXTREME OBESITY             III                >40                            Patient's  BMI Body mass index is 26.91 kg/(m^2).  Http://hin.nhlbi.nih.gov/menuplanner/menu.cgi

## 2017-06-01 ENCOUNTER — TELEPHONE (OUTPATIENT)
Dept: FAMILY MEDICINE | Facility: CLINIC | Age: 52
End: 2017-06-01

## 2017-06-16 ENCOUNTER — OFFICE VISIT (OUTPATIENT)
Dept: FAMILY MEDICINE | Facility: CLINIC | Age: 52
End: 2017-06-16

## 2017-06-16 VITALS
TEMPERATURE: 97.6 F | WEIGHT: 224 LBS | HEART RATE: 84 BPM | DIASTOLIC BLOOD PRESSURE: 70 MMHG | OXYGEN SATURATION: 97 % | SYSTOLIC BLOOD PRESSURE: 130 MMHG | BODY MASS INDEX: 27.27 KG/M2

## 2017-06-16 DIAGNOSIS — I82.442 ACUTE DEEP VEIN THROMBOSIS (DVT) OF TIBIAL VEIN OF LEFT LOWER EXTREMITY (H): Primary | ICD-10-CM

## 2017-06-16 LAB — INR POINT OF CARE: 2.2 (ref 2–3)

## 2017-06-16 PROCEDURE — 85610 PROTHROMBIN TIME: CPT | Performed by: PHYSICIAN ASSISTANT

## 2017-06-16 PROCEDURE — 99213 OFFICE O/P EST LOW 20 MIN: CPT | Performed by: PHYSICIAN ASSISTANT

## 2017-06-16 PROCEDURE — 36415 COLL VENOUS BLD VENIPUNCTURE: CPT | Performed by: PHYSICIAN ASSISTANT

## 2017-06-16 RX ORDER — WARFARIN SODIUM 5 MG/1
TABLET ORAL
Qty: 180 TABLET | Refills: 0 | Status: SHIPPED | OUTPATIENT
Start: 2017-06-16

## 2017-06-16 NOTE — NURSING NOTE
Moose is here for INR    Pre-Visit Screening :  Immunizations : up to date  Colon Screening : is up to date  Asthma Action Test/Plan : NA  PHQ9/GAD7 :  NA  Pulse - regular  My Chart - accepts    CLASSIFICATION OF OVERWEIGHT AND OBESITY BY BMI                         Obesity Class           BMI(kg/m2)  Underweight                                    < 18.5  Normal                                         18.5-24.9  Overweight                                     25.0-29.9  OBESITY                     I                  30.0-34.9                              II                 35.0-39.9  EXTREME OBESITY             III                >40                             Patient's  BMI Body mass index is 27.27 kg/(m^2).  http://hin.nhlbi.nih.gov/menuplanner/menu.cgi  Questioned patient about current smoking habits.  Pt. has never smoked.

## 2017-06-16 NOTE — PROGRESS NOTES
Indication: DVT    INR Today: 2.2  Current Dose:  7.5 mg 3 days per week, 10 mg 4 days per week     Moose was having trouble with pain at the site of DVT at last appt, but this has now resolved.    Bleeding Signs/Symptoms:  None  Including headache, stroke symptoms (confusion, weakness, slurred speech), vision changes, nosebleeds, red/black vomit, red/black stool, prolonged bleeded, dizziness, SOB, pica.  Thromboembolic Signs/Symptoms:  None  Including pain in one leg, swelling in extremity, chest pain, numbess, weakness, mental status change.      Medication Changes:  No  Dietary Changes:  No  Bacterial/Viral Infection:  No    Missed Coumadin Doses:  None    Other Concerns:  None    OBJECTIVE:  /70 (BP Location: Right arm, Patient Position: Chair, Cuff Size: Adult Large)  Pulse 84  Temp 97.6  F (36.4  C) (Oral)  Wt 101.6 kg (224 lb)  SpO2 97%  BMI 27.27 kg/m2  Body mass index is 27.27 kg/(m^2).    Patient is a 52 year old male, in no acute distress. Vitals noted  Head: Normocephalic, atraumatic.  Eyes: Conjunctiva clear.  No discharge noted.  Ears: External ears, canals and TMs normal Bilaterally: gray and translucent.   Nose: Normal without discharge.  Mouth / Throat:   Pharynx non-erythematous, no exudates present, tonsils without hypertrophy. Mucous membranes moist.  Neck:  Neck supple, No lymphadenopathy, no thyromegaly.  Cardiac:  Normal rhythm and rate, no murmurs, rubs or gallops.  Lungs: clear to auscultation bilaterally; no wheezes, crackles or rhonchi      ASSESSMENT and PLAN:    Therapeutic INR for goal of 2-3    New Dose: (No Change) Coumadin 7.5 mg 3 days per week, 10 mg 4 days per week    Will send through new prescription.    Next INR: 4-5 weeks    Libia Jimenez PA-C  6/16/2017

## 2017-06-16 NOTE — MR AVS SNAPSHOT
"              After Visit Summary   6/16/2017    Moose Enciso    MRN: [id number]           Patient Information     Date Of Birth          1965        Visit Information        Provider Department      6/16/2017 9:30 AM Libia Jimenez PA-C [facility]        Today's Diagnoses     Acute deep vein thrombosis (DVT) of tibial vein of left lower extremity (H)    -  1       Follow-ups after your visit        Follow-up notes from your care team     Return in about 4 weeks (around 7/14/2017) for Lab Work.      Your next 10 appointments already scheduled     Jul 14, 2017  7:30 AM CDT   Office Visit with Narciso Gonzalez MD   [facility] ([facility])    [address]  [address]  [facility] [address] [phone]              Who to contact     If you have questions or need follow up information about today's clinic visit or your schedule please contact [facility] directly at [phone].  Normal or non-critical lab and imaging results will be communicated to you by Valconhart, letter or phone within 4 business days after the clinic has received the results. If you do not hear from us within 7 days, please contact the clinic through Valconhart or phone. If you have a critical or abnormal lab result, we will notify you by phone as soon as possible.  Submit refill requests through Inuvo or call your pharmacy and they will forward the refill request to us. Please allow 3 business days for your refill to be completed.          Additional Information About Your Visit        Valconhart Information     Inuvo lets you send messages to your doctor, view your test results, renew your prescriptions, schedule appointments and more. To sign up, go to [email or web address] . Click on \"Log in\" on the left side of the screen, which will take you to the Welcome page. Then click on \"Sign up Now\" on the right side of the page. "     You will be asked to enter the access code listed below, as well as some personal information. Please follow the directions to create your username and password.     Your access code is: [email or web address]  Expires: 2017  9:40 AM     Your access code will  in 90 days. If you need help or a new code, please call your [place] clinic or [phone].        Care EveryWhere ID     This is your Care EveryWhere ID. This could be used by other organizations to access your [place] medical records  [id number]        Your Vitals Were     Pulse Temperature Pulse Oximetry BMI (Body Mass Index)          84 97.6  F (36.4  C) (Oral) 97% 27.27 kg/m2         Blood Pressure from Last 3 Encounters:   17 130/70   17 120/74   17 112/64    Weight from Last 3 Encounters:   17 101.6 kg (224 lb)   17 100.3 kg (221 lb 1.6 oz)   17 97.5 kg (215 lb)              We Performed the Following     PROTHROMBIN TIME/INR     VENOUS COLLECTION          Today's Medication Changes          These changes are accurate as of: 17 10:28 AM.  If you have any questions, ask your nurse or doctor.               These medicines have changed or have updated prescriptions.        Dose/Directions    warfarin 5 MG tablet   Commonly known as:  COUMADIN   This may have changed:  See the new instructions.   Used for:  Acute deep vein thrombosis (DVT) of tibial vein of left lower extremity (H)   Changed by:  Libia Jimenez PA-C        TAKE 2 TABLETS BY MOUTH MONDAY, WEDNESDAY, FRIDAY, , OTHER DAYS TAKE 1 1/2 TABLETS   Quantity:  180 tablet   Refills:  0            Where to get your medicines      These medications were sent to [facility] [id number] [place] [address] [address] AT [place] [address] & [facility] [address]  [address], [facility] [address]     Phone:  [phone]     warfarin 5 MG tablet                Primary Care Provider Office Phone # Fax #    Narciso Gonzalez MD  [phone]       [facility] [address] [facility] [address]  [facility] [address]        Thank you!     Thank you for choosing [facility]  for your care. Our goal is always to provide you with excellent care. Hearing back from our patients is one way we can continue to improve our services. Please take a few minutes to complete the written survey that you may receive in the mail after your visit with us. Thank you!             Your Updated Medication List - Protect others around you: Learn how to safely use, store and throw away your medicines at www.disposemymeds.org.          This list is accurate as of: 6/16/17 10:28 AM.  Always use your most recent med list.                   Brand Name Dispense Instructions for use    warfarin 5 MG tablet    COUMADIN    180 tablet    TAKE 2 TABLETS BY MOUTH MONDAY, WEDNESDAY, FRIDAY, SUNDAY, OTHER DAYS TAKE 1 1/2 TABLETS

## 2017-07-14 ENCOUNTER — OFFICE VISIT (OUTPATIENT)
Dept: FAMILY MEDICINE | Facility: CLINIC | Age: 52
End: 2017-07-14

## 2017-07-14 VITALS
HEIGHT: 76 IN | TEMPERATURE: 98 F | BODY MASS INDEX: 27.52 KG/M2 | HEART RATE: 65 BPM | WEIGHT: 226 LBS | DIASTOLIC BLOOD PRESSURE: 68 MMHG | SYSTOLIC BLOOD PRESSURE: 118 MMHG | OXYGEN SATURATION: 97 %

## 2017-07-14 DIAGNOSIS — I82.442 ACUTE DEEP VEIN THROMBOSIS (DVT) OF TIBIAL VEIN OF LEFT LOWER EXTREMITY (H): Primary | ICD-10-CM

## 2017-07-14 LAB — INR POINT OF CARE: 2.3 (ref 0.9–1.1)

## 2017-07-14 PROCEDURE — 85610 PROTHROMBIN TIME: CPT | Performed by: FAMILY MEDICINE

## 2017-07-14 PROCEDURE — 99213 OFFICE O/P EST LOW 20 MIN: CPT | Performed by: FAMILY MEDICINE

## 2017-07-14 PROCEDURE — 36415 COLL VENOUS BLD VENIPUNCTURE: CPT | Performed by: FAMILY MEDICINE

## 2017-07-14 NOTE — MR AVS SNAPSHOT
"              After Visit Summary   7/14/2017    Moose Enciso    MRN: [id number]           Patient Information     Date Of Birth          1965        Visit Information        Provider Department      7/14/2017 7:30 AM Narciso Gonzalez MD [facility]        Today's Diagnoses     Acute deep vein thrombosis (DVT) of tibial vein of left lower extremity (H)    -  1       Follow-ups after your visit        Who to contact     If you have questions or need follow up information about today's clinic visit or your schedule please contact [facility] directly at [phone].  Normal or non-critical lab and imaging results will be communicated to you by MyChart, letter or phone within 4 business days after the clinic has received the results. If you do not hear from us within 7 days, please contact the clinic through MyChart or phone. If you have a critical or abnormal lab result, we will notify you by phone as soon as possible.  Submit refill requests through [employer] or call your pharmacy and they will forward the refill request to us. Please allow 3 business days for your refill to be completed.          Additional Information About Your Visit        Care EveryWhere ID     This is your Care EveryWhere ID. This could be used by other organizations to access your [facility] medical records  [id number]        Your Vitals Were     Pulse Temperature Height Pulse Oximetry BMI (Body Mass Index)       65 98  F (36.7  C) (Oral) 1.93 m (6' 4\") 97% 27.51 kg/m2        Blood Pressure from Last 3 Encounters:   07/14/17 118/68   06/16/17 130/70   05/19/17 120/74    Weight from Last 3 Encounters:   07/14/17 102.5 kg (226 lb)   06/16/17 101.6 kg (224 lb)   05/19/17 100.3 kg (221 lb 1.6 oz)              We Performed the Following     PROTHROMBIN TIME/INR     VENOUS COLLECTION        Primary Care Provider Office Phone # Fax #    Narciso Gonzalez [phone]       " [facility] [address]  [facility] [address]        Equal Access to Services     RAI RICHARD : Hadii felicity gonzalez hadaugustineo Sotrinaali, waaxda luqadaha, qaybta kaalmada adefrankda, angela jacqui shabnambobby raygozamarcy catalan jessica booker. So [facility] [phone].    ATENCIÓN: Si habla español, tiene a quarles disposición servicios gratuitos de asistencia lingüística. Llame al [phone].    We comply with applicable federal civil rights laws and [place] laws. We do not discriminate on the basis of race, color, national origin, age, disability sex, sexual orientation or gender identity.            Thank you!     Thank you for choosing [facility]  for your care. Our goal is always to provide you with excellent care. Hearing back from our patients is one way we can continue to improve our services. Please take a few minutes to complete the written survey that you may receive in the mail after your visit with us. Thank you!             Your Updated Medication List - Protect others around you: Learn how to safely use, store and throw away your medicines at www.disposemymeds.org.          This list is accurate as of: 7/14/17  7:38 AM.  Always use your most recent med list.                   Brand Name Dispense Instructions for use Diagnosis    warfarin 5 MG tablet    COUMADIN    180 tablet    TAKE 2 TABLETS BY MOUTH MONDAY, WEDNESDAY, FRIDAY, SUNDAY, OTHER DAYS TAKE 1 1/2 TABLETS    Acute deep vein thrombosis (DVT) of tibial vein of left lower extremity (H)

## 2017-07-14 NOTE — NURSING NOTE
Moose Enciso is here today for and INR follow up.    Pre-Visit Screening :  Immunizations : up to date  Colon Screening : Patient Declines  Asthma Action Test/Plan : NA  PHQ9/GAD7 :  NA  Pulse - regular  My Chart - declines    CLASSIFICATION OF OVERWEIGHT AND OBESITY BY BMI                         Obesity Class           BMI(kg/m2)  Underweight                                    < 18.5  Normal                                         18.5-24.9  Overweight                                     25.0-29.9  OBESITY                     I                  30.0-34.9                              II                 35.0-39.9  EXTREME OBESITY             III                >40                             Patient's  BMI Body mass index is 27.51 kg/(m^2).  http://hin.nhlbi.nih.gov/menuplanner/menu.cgi  Questioned patient about current smoking habits.  Pt. has never smoked.  Kya Mays, CMA

## 2017-07-14 NOTE — PROGRESS NOTES
"ANTICOAGULATION FOLLOW-UP CLINIC VISIT      Moose Enciso    July 14, 2017      SUBJECTIVE:      Indication: DVT and Factor V Leiden      Bleeding Signs/Symptoms:  None  Thromboembolic Signs/Symptoms:  None      Medication Changes:  No  Dietary Changes:  No  Bacterial/Viral Infection:  No      Missed Coumadin Doses:  None      Other Concerns:  No      OBJECTIVE:  /68 (BP Location: Right arm, Patient Position: Chair, Cuff Size: Adult Large)  Pulse 65  Temp 98  F (36.7  C) (Oral)  Ht 1.93 m (6' 4\")  Wt 102.5 kg (226 lb)  SpO2 97%  BMI 27.51 kg/m2   S1 and S2 normal, no murmurs, clicks, gallops or rubs. Regular rate and rhythm. Chest is clear; no wheezes or rales. No edema or JVD.     INR Today:  2.3  Current Dose:  Coumadin   10 mg per day except 7.5 mg MWF          ASSESSMENT:      Therapeutic INR for goal of 2-3          PLAN:      New Dose: Coumadin  10  mg per day except 7.5 mg MWF        Next INR: 6 weeks           "

## 2021-02-15 NOTE — TELEPHONE ENCOUNTER
Called to inform that repeat US after DVT was negative, but was unable to reach pt, and voicemail was not set up. Only have 1 number for pt. Scheduled for appt 6/16, so can discuss then if he does not contact us sooner.  
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

## 2022-12-25 NOTE — TELEPHONE ENCOUNTER
Patient is requesting a refill of the following meds:  Pending Prescriptions:                       Disp   Refills    warfarin (COUMADIN) 5 MG tablet [Pharmacy*90 tab*0            Sig: TAKE 2 TABLETS BY MOUTH MONDAY, WEDNESDAY,           FRIDAY, SUNDAY, OTHER DAYS TAKE 1 1/2 TABLETS    Last Office Visit Pertaining to Medication: 04/07/2017  Scheduled Office Visit: 05/19/2017    Please Close Encounter If Approved.    Thank You,  Kya Mays, CMA    
25-Dec-2022 14:42